# Patient Record
Sex: FEMALE | Race: WHITE
[De-identification: names, ages, dates, MRNs, and addresses within clinical notes are randomized per-mention and may not be internally consistent; named-entity substitution may affect disease eponyms.]

---

## 2018-01-07 ENCOUNTER — HOSPITAL ENCOUNTER (EMERGENCY)
Dept: HOSPITAL 59 - ER | Age: 58
Discharge: HOME | End: 2018-01-07
Payer: COMMERCIAL

## 2018-01-07 DIAGNOSIS — K92.1: Primary | ICD-10-CM

## 2018-01-07 DIAGNOSIS — R10.84: ICD-10-CM

## 2018-01-07 DIAGNOSIS — R11.0: ICD-10-CM

## 2018-01-07 LAB
ALBUMIN SERPL-MCNC: 4.5 G/DL (ref 4–5)
ALBUMIN/GLOB SERPL: 1.4 {RATIO} (ref 1.1–1.8)
ALP SERPL-CCNC: 82 U/L (ref 35–104)
ALT SERPL-CCNC: 10 U/L (ref ?–33)
ANION GAP SERPL CALC-SCNC: 14 MMOL/L (ref 7–16)
APPEARANCE UR: CLEAR
AST SERPL-CCNC: 15 U/L (ref 10–35)
BACTERIA #/AREA URNS HPF: (no result) /[HPF]
BASOPHILS NFR BLD: 0.4 % (ref 0–6)
BILIRUB SERPL-MCNC: 0.7 MG/DL (ref 0.2–1)
BILIRUB UR-MCNC: NEGATIVE MG/DL
BUN SERPL-MCNC: 10 MG/DL (ref 6–20)
CO2 SERPL-SCNC: 24 MMOL/L (ref 22–29)
COLOR UR: (no result)
CREAT SERPL-MCNC: 0.6 MG/DL (ref 0.5–0.9)
EOSINOPHIL NFR BLD: 0.4 % (ref 0–6)
EPI CELLS #/AREA URNS HPF: (no result) /[HPF]
ERYTHROCYTE [DISTWIDTH] IN BLOOD BY AUTOMATED COUNT: 13.1 % (ref 11.5–14.5)
EST GLOMERULAR FILTRATION RATE: > 60 ML/MIN
GLOBULIN SER-MCNC: 3.2 GM/DL (ref 1.4–4.8)
GLUCOSE SERPL-MCNC: 106 MG/DL (ref 74–109)
GLUCOSE UR STRIP-MCNC: NEGATIVE MG/DL
GRANULOCYTES NFR BLD: 62.1 % (ref 47–80)
HCT VFR BLD CALC: 41.6 % (ref 35–47)
HGB BLD-MCNC: 13.6 GM/DL (ref 11.6–16)
KETONES UR QL STRIP: NEGATIVE
LIPASE SERPL-CCNC: 27 U/L (ref 13–60)
LYMPHOCYTES NFR BLD AUTO: 29.9 % (ref 16–45)
MCH RBC QN AUTO: 29.6 PG (ref 27–33)
MCHC RBC AUTO-ENTMCNC: 32.7 G/DL (ref 32–36)
MCV RBC AUTO: 90.6 FL (ref 81–97)
MONOCYTES NFR BLD: 7.2 % (ref 0–9)
NITRITE UR QL STRIP: NEGATIVE
PLATELET # BLD: 264 K/UL (ref 130–400)
PMV BLD AUTO: 11 FL (ref 7.4–10.4)
PROT SERPL-MCNC: 7.7 G/DL (ref 6.6–8.7)
PROT UR QL STRIP: NEGATIVE
RBC # BLD AUTO: 4.59 M/UL (ref 3.8–5.4)
RBC # UR STRIP: (no result) /UL
RBC #/AREA URNS HPF: (no result) /[HPF]
URINE LEUKOCYTE ESTERASE: NEGATIVE
UROBILINOGEN UR STRIP-ACNC: 0.2 E.U./DL (ref 0.2–1)
WBC # BLD AUTO: 4.9 K/UL (ref 4.2–12.2)
WBC #/AREA URNS HPF: (no result) /[HPF]

## 2018-01-07 PROCEDURE — 74177 CT ABD & PELVIS W/CONTRAST: CPT

## 2018-01-07 PROCEDURE — 99284 EMERGENCY DEPT VISIT MOD MDM: CPT

## 2018-01-07 PROCEDURE — 80053 COMPREHEN METABOLIC PANEL: CPT

## 2018-01-07 PROCEDURE — 83690 ASSAY OF LIPASE: CPT

## 2018-01-07 PROCEDURE — 85025 COMPLETE CBC W/AUTO DIFF WBC: CPT

## 2018-01-07 PROCEDURE — 81001 URINALYSIS AUTO W/SCOPE: CPT

## 2018-01-07 PROCEDURE — 96374 THER/PROPH/DIAG INJ IV PUSH: CPT

## 2018-01-07 NOTE — EMERGENCY DEPARTMENT RECORD
History of Present Illness





- General


Chief Complaint: Abdominal Pain


Stated Complaint: ABDOMINAL PAIN


Source: Patient, Family


Mode of Arrival: Ambulatory


Limitations: No limitations





- History of Present Illness


Initial Comments: 


58 yo female presents with 6 days of a fullness, bloating, decrease in stools.  

She reports a history of diverticulosis with irregular bowel movements as a 

normal.  The last 6 days she has had bloating, pressure, some blood.  No 

fevers.  She has some decrease in appetite but no vomiting.  She reports her 

last colonscopy was about 8 years ago.  She has the known diverticulosis.  She 

periodically has blood in her stools.  No weight loss or sweats.  PCP Praveen DURANT Complaint: Abdominal pain


-: Days(s) (6)


Location: LUQ, RUQ


Radiation: LUQ, RUQ


Migration to: LUQ, RUQ


Quality: Aching, Cramping, Fullness


Consistency: Intermittent


Improves With: Nothing


Worsens With: Eating


Context: Other


Associated Symptoms: Anorexia, Constipation, Hematochezia, Nausea





- Related Data


Patient Pregnant: No


 Previous Rx's











 Medication  Instructions  Recorded


 


Ondansetron [Zofran Odt] 4 mg PO Q8H #15 tab.rapdis 01/07/18


 


Pantoprazole Sodium [Protonix] 20 mg PO DAILY #30 tablet. 01/07/18











 Allergies











Allergy/AdvReac Type Severity Reaction Status Date / Time


 


No Known Drug Allergies Allergy   Verified 01/07/18 08:26














Review of Systems


Constitutional: Denies: Chills, Fever, Malaise, Weakness


Eyes: Denies: Eye discharge


ENT: Denies: Congestion, Throat pain


Respiratory: Denies: Cough, Dyspnea, Hemoptysis, Stridor, Wheezes


Cardiovascular: Denies: Chest pain, Palpitations, Syncope


Endocrine: Denies: Fatigue, Polydipsia, Polyuria


Gastrointestinal: Reports: As per HPI, Abdominal pain, Constipation, 

Hematochezia, Nausea.  Denies: Diarrhea, Hematemesis, Vomiting


Genitourinary: Denies: Discharge, Dyspareunia, Dysuria, Retention, Urgency


Musculoskeletal: Denies: Arthralgia, Back pain, Joint swelling, Myalgia


Skin: Denies: Bruising, Change in color, Rash


Neurological: Denies: Confusion, Numbness, Weakness


Psychiatric: Denies: Anxiety


Hematological/Lymphatic: Denies: Blood Clots, Easy bleeding, Easy bruising, 

Swollen glands





Physical Exam





- General


General Appearance: Alert, Oriented x3, Cooperative, No acute distress


Limitations: No limitations





- Head


Head exam: Normal inspection





- Eye


Eye exam: Normal appearance, PERRL


Pupils: Normal accommodation





- ENT


ENT exam: Normal exam


Ear exam: Normal external inspection


Nasal Exam: Normal inspection


Mouth exam: Normal external inspection


Teeth exam: Normal inspection


Throat exam: Normal inspection.  negative: Tonsillar erythema





- Neck


Neck exam: Normal inspection, Full ROM.  negative: Tenderness





- Respiratory


Respiratory exam: Normal lung sounds bilaterally.  negative: Accessory muscle 

use, Decreased breath sounds, Prolonged expiratory, Respiratory distress, 

Rhonchi, Stridor, Wheezes





- Cardiovascular


Cardiovascular Exam: Regular rate, Normal rhythm, Normal heart sounds





- GI/Abdominal


GI/Abdominal exam: Soft, Tenderness (mild tenderness RUQ and LUQ, abdomen is 

very soft, no mass).  negative: Distended, Guarding, Rebound, Rigid





- Rectal


Rectal exam: Deferred





- 


 exam: Deferred





- Extremities


Extremities exam: Normal inspection, Full ROM, Normal capillary refill.  

negative: Calf tenderness, Pedal edema, Tenderness





- Back


Back exam: Reports: Normal inspection, Full ROM.  Denies: CVA tenderness (R), 

CVA tenderness (L), Muscle spasm, Rash noted, Tenderness





- Neurological


Neurological exam: Alert, Normal gait, Oriented X3





- Psychiatric


Psychiatric exam: Normal affect, Normal mood





- Skin


Skin exam: Dry, Intact, Normal color, Warm





Course





- Reevaluation(s)


Reevaluation #1: 





01/07/18 09:01


The CBC was reviewed.


No acute changes.


01/07/18 09:14


No acute changes on the CMP


01/07/18 09:31


UA negative for acute infection


01/07/18 12:28


The CT scan was reviewed.  NO acute findings.  Diverticulosis.  No obstruction.

  Normal bowel.


DC home with referral for GI





Medical Decision Making





- Lab Data


Result diagrams: 


 01/07/18 08:38





 01/07/18 08:38





Disposition


Disposition: Discharge


Clinical Impression: 


Abdominal pain


Qualifiers:


 Abdominal location: generalized Qualified Code(s): R10.84 - Generalized 

abdominal pain





GI bleed


Qualifiers:


 GI bleed type/associated pathology: unspecified gastrointestinal hemorrhage 

type Qualified Code(s): K92.2 - Gastrointestinal hemorrhage, unspecified





Disposition: Home, Self-Care


Condition: (1) Good


Instructions:  Abdominal Pain (ED)


Additional Instructions: 


Call your doctor for close follow up


Return for a recheck if worse, fever, vomiting or concerns


You have been referred to the GI Clinic at the [facility]


Prescriptions: 


Ondansetron [Zofran Odt] 4 mg PO Q8H #15 tab.rapdis


Pantoprazole Sodium [Protonix] 20 mg PO DAILY #30 tablet.dr


Referrals: 


IVORY AVILA [DOCTOR OF OSTEOPATH] - 


[facility] [Provider Group]


Forms:  Patient Portal Access


Time of Disposition: 12:29





Quality





- Quality Measures


Quality Measures: N/A





- Blood Pressure Screening


Does Patient Have Any of the Following: No


Blood Pressure Classification: Pre-Hypertensive BP Reading


Systolic Measurement: 132


Diastolic Measurement: 86


Screening for High Blood Pressure: < Pre-Hypertensive BP, F/U Documented > [

]


Pre-Hypertensive Follow-up Interventions: Referral to alternative/primary care 

provider.

## 2018-01-08 NOTE — CT SCAN REPORT
EXAM:  EMERGENCY CT SCAN OF THE ABDOMEN AND PELVIS WITH CONTRAST 



HISTORY:  RECTAL BLEEDING AND BILATERAL LOWER ABDOMINAL PAIN FOR FIVE DAYS.  



TECHNIQUE:  Axial CT scan of the abdomen and pelvis was obtained following the 
intravenous administration of 100 ml of Omnipaque 350 as the IV contrast.   
Oral contrast was also utilized.  



Comparison:  No prior CT with which to compare.  



FINDINGS:  No calcified gallstones are seen within the gallbladder.  There is a 
single tiny low attenuation mass in the right lobe of the liver only about 5 mm 
in size with a CT density of 16 consistent with a tiny hepatic cyst.  No other 
hepatic mass evident.  No definite splenic, adrenal, pancreatic, or renal mass 
identified.  Minor diverticulosis scattered throughout the colon, but no 
diverticulitis evident.  The appendix is not well seen, but no appendicitis 
identified.  There is a small subpleural nodule in the left posterior 
costophrenic angle on image 19 of 114 measuring about 6 mm in size.  This is 
radiographically indeterminate.  Follow-up chest CT in six months time is 
suggested to reassess.  No free intraperitoneal air evident.  Prominent facet 
joint arthropathy in the lower lumbar spine.  The etiology of the patient's 
rectal bleeding not certain from this study.  



IMPRESSION:  

1.  SOME MILD DIVERTICULOSIS IN THE COLON WITH NO DIVERTICULITIS EVIDENT.  



2.  APPROXIMATELY 5 MM CYST RIGHT LOBE OF THE LIVER.  



3.  SUBPLEURAL INDETERMINATE NODULE 6 MM IN SIZE LEFT LATERAL COSTOPHRENIC 
ANGLE POSTERIORLY.  FOLLOW-UP CHEST CT IN SIX MONTHS TIME SUGGESTED.  



JOB NUMBER:  [id number]
MTDD